# Patient Record
Sex: MALE | Race: WHITE
[De-identification: names, ages, dates, MRNs, and addresses within clinical notes are randomized per-mention and may not be internally consistent; named-entity substitution may affect disease eponyms.]

---

## 2020-08-11 DIAGNOSIS — Z31.41 ENCOUNTER FOR SPERM COUNT FOR FERTILITY TESTING: ICD-10-CM

## 2020-08-11 PROCEDURE — 89322 SEMEN ANAL STRICT CRITERIA: CPT

## 2020-08-14 LAB
ABNORMAL SPERM: 95 MORPHOLOGY
ABSTINENCE DAYS: 2 DAYS (ref 2–7)
AGGLUTINATION: NO YES/NO
ANALYSIS TEMP - CENTIGRADE: 23 CENTIGRADE
CELL FRAGMENTS: NORMAL %
COLLECTION METHOD: NORMAL
COLLECTION SITE: NORMAL
CONSENT TO RELEASE TO PARTNER: YES
HEAD DEFECT: 96
IMMATURE SPERM: NORMAL %
IMMOTILE: 30 %
LAB RECEIPT TIME: NORMAL
LIQUEFIED: YES YES/NO
MIDPIECE DEFECT: 33
NON-PROGRESSIVE MOTILITY: 3 %
NORMAL SPERM: 5 % NORMAL FORMS (ref 4–?)
PROGRESSIVE MOTILITY: 67 % (ref 32–?)
ROUND CELLS: 0.2 MILLION/ML (ref ?–2)
SPECIMEN CONCENTRATION: 51 MILLION/ML (ref 15–?)
SPECIMEN PH: 7.6 PH (ref 7.2–?)
SPECIMEN TYPE: NORMAL
SPECIMEN VOL UR: 2 ML (ref 1.5–?)
TAIL DEFECT: 4
TIME OF ANALYSIS: NORMAL
TOTAL NUMBER: 102 MILLION (ref 39–?)
TOTAL PROGRESSIVE MOTILE: 68 MILLION (ref 15.6–?)
VISCOUS: NO YES/NO
VITALITY: NORMAL % (ref 58–?)
WBC SPECIMEN: NORMAL %

## 2021-01-04 ENCOUNTER — HEALTH MAINTENANCE LETTER (OUTPATIENT)
Age: 36
End: 2021-01-04

## 2021-02-04 ENCOUNTER — TRANSFERRED RECORDS (OUTPATIENT)
Dept: HEALTH INFORMATION MANAGEMENT | Facility: CLINIC | Age: 36
End: 2021-02-04

## 2021-10-11 ENCOUNTER — HEALTH MAINTENANCE LETTER (OUTPATIENT)
Age: 36
End: 2021-10-11

## 2022-01-30 ENCOUNTER — HEALTH MAINTENANCE LETTER (OUTPATIENT)
Age: 37
End: 2022-01-30

## 2022-09-24 ENCOUNTER — HEALTH MAINTENANCE LETTER (OUTPATIENT)
Age: 37
End: 2022-09-24

## 2023-05-08 ENCOUNTER — HEALTH MAINTENANCE LETTER (OUTPATIENT)
Age: 38
End: 2023-05-08

## 2023-06-23 ENCOUNTER — DOCUMENTATION ONLY (OUTPATIENT)
Dept: CONSULT | Facility: CLINIC | Age: 38
End: 2023-06-23
Payer: COMMERCIAL

## 2023-06-23 DIAGNOSIS — Z84.89 FAMILY HISTORY OF GENETIC DISEASE: Primary | ICD-10-CM

## 2023-06-23 NOTE — PROGRESS NOTES
Spoke with Tani as a part of his son Feliciano's appointment in the pediatric genetics clinic. The following information was reviewed and Tani consented to participation in whole exome sequencing genetic testing.    Discussion: Given Feliciano's medical history, a genetic testing called whole exome sequencing is recommended.     Basic genetic information was reviewed prior to a discussion of genetic testing. Our genes are sequences of letters that provide instructions that help our body grow, develop and function. Sometimes a change occurs in one of our genes that causes the body to be unable to read these instructions. This results in a genetic condition.     Whole exome sequencing is the largest genetic test that is currently available in our clinic. This test looks for variants or changes in almost all of the genes (~20,000). To complete this test, we take samples from the child and both parents. Parent samples help the lab to narrow down all of the changes in Feliciano's genes and identify which seem to be most important. If the lab finds a genetic change in Feliciano, they are also able to tell us if he inherited that change from his parents or if this change is brand new in him. Although we ask parents to provide samples, this testing will only provide information regarding a change in their genes if it was found in Feliciano first.     There are three possible results for this testing:    o Positive: A positive result indicates that a genetic variant has been identified that explains the cause of Feliciano s symptoms. A positive result can provide information on prognosis and other symptoms related to the genetic change. It can also help guide medical management for Feliciano and will provide information to other family members regarding their risk.   o Negative: A negative result indicates that a disease causing genetic variant was not identified  o Variant of uncertain significance (VUS): A VUS is an uncertain result that  indicates a genetic change was identified, but it is currently unknown if that change is associated with a genetic disorder.     Whole exome sequencing can also provide information regarding certain conditions that are unrelated to the reason we are doing the testing today. These are called secondary findings. Currently, there is a list of over 70 genes that are considered medically actionable meaning if we know there is a change in these genes there is something we can change in a person's medical management to help keep them healthy. Parents can choose whether or not receive these secondary findings for Feliciano as well as for themselves. At today's appointment, Feliciano's parents declined secondary findings for him and themselves.    Whole exome sequencing will not tell us information about whether Feliciano is a carrier for certain genetic condition, if he has gene changes that increase or decrease his risk for common diseases with many environmental components such as diabetes, and if he has changes in genes that cause adult onset disease in which medical intervention is not available such as Alzeimer's Disease.     Additionally, this test can identify non paternity, if parents are related, and family members may learn that they have an increased risk to develop a certain condition based on the results of this testing.    Feliciano's family was informed that another type of genetic testing (gene panel) is available if they are uncomfortable with any of the information provided above. This genetic testing may not be as informative as [facility] but could still be helpful in the event that they decide not to pursue whole exome sequencing.    Risks, benefits and limitations for whole exome sequencing was reviewed including information related to ROMY, and expected turn around time. Positive results in this genetic test could provide important information related to Feliciano's health and may have implications for his medical  management. Feliciano's expressed a good understanding of this information and decided to pursue genetic testing today.    Feliciano's parents plan to undergo IVF in September and hope to have the results of this testing as soon as possible for family planning purposes. I have sent a message to the lab expressing their desire for a quick turn around time. However, there are no guarantees that this request will be fulfilled.    Plan:   1. Feliciano's family expressed an excellent understanding of this information and decided to pursue testing today for whole exome sequencing with Feliciano as the proband and his mother Luz Morris 4/18/81 and father Kobi Lozano 6/22/87 as family member participants.  2. Return pending results of genetic testing  3. Contact information was provided should any questions arise in the future.         Anum Avina MS [facility]  Genetic Counselor  Division of Genetics and Metabolism  p) [phone]  (f) [phone]    Total time spent in consultation with the family was approximately 60 minutes